# Patient Record
Sex: FEMALE | Race: WHITE | URBAN - METROPOLITAN AREA
[De-identification: names, ages, dates, MRNs, and addresses within clinical notes are randomized per-mention and may not be internally consistent; named-entity substitution may affect disease eponyms.]

---

## 2024-10-09 ENCOUNTER — TELEPHONE (OUTPATIENT)
Age: 44
End: 2024-10-09

## 2024-10-09 NOTE — TELEPHONE ENCOUNTER
This  pt  said you said you would take her as a NP please advise she is a friend of someone you know

## 2024-10-10 NOTE — TELEPHONE ENCOUNTER
Yes, I did agree  If she needs a sooner apt then when I'm scheduling- find out dates/times that work for her and I can put her in sooner.  Maybe WED afternoon

## 2024-10-16 ENCOUNTER — OFFICE VISIT (OUTPATIENT)
Age: 44
End: 2024-10-16
Payer: COMMERCIAL

## 2024-10-16 VITALS
BODY MASS INDEX: 21.44 KG/M2 | HEIGHT: 67 IN | WEIGHT: 136.6 LBS | HEART RATE: 91 BPM | TEMPERATURE: 97.9 F | SYSTOLIC BLOOD PRESSURE: 112 MMHG | OXYGEN SATURATION: 99 % | DIASTOLIC BLOOD PRESSURE: 76 MMHG

## 2024-10-16 DIAGNOSIS — Z12.31 ENCOUNTER FOR SCREENING MAMMOGRAM FOR MALIGNANT NEOPLASM OF BREAST: ICD-10-CM

## 2024-10-16 DIAGNOSIS — N92.4 EXCESSIVE BLEEDING IN PREMENOPAUSAL PERIOD: ICD-10-CM

## 2024-10-16 DIAGNOSIS — G43.809 OTHER MIGRAINE WITHOUT STATUS MIGRAINOSUS, NOT INTRACTABLE: ICD-10-CM

## 2024-10-16 DIAGNOSIS — R53.83 OTHER FATIGUE: ICD-10-CM

## 2024-10-16 DIAGNOSIS — Z00.00 WELL ADULT EXAM: Primary | ICD-10-CM

## 2024-10-16 PROCEDURE — 99386 PREV VISIT NEW AGE 40-64: CPT | Performed by: FAMILY MEDICINE

## 2024-10-16 RX ORDER — RIZATRIPTAN BENZOATE 10 MG/1
10 TABLET, ORALLY DISINTEGRATING ORAL
Qty: 9 TABLET | Refills: 1 | Status: SHIPPED | OUTPATIENT
Start: 2024-10-16 | End: 2024-10-16

## 2024-10-16 RX ORDER — PERFLUOROHEXYLOCTANE 1 MG/MG
SOLUTION OPHTHALMIC 2 TIMES DAILY
COMMUNITY

## 2024-10-16 RX ORDER — PROPRANOLOL HCL 60 MG
60 CAPSULE, EXTENDED RELEASE 24HR ORAL DAILY
Qty: 30 CAPSULE | Refills: 3 | Status: SHIPPED | OUTPATIENT
Start: 2024-10-16

## 2024-10-16 RX ORDER — LIFITEGRAST 50 MG/ML
5 SOLUTION/ DROPS OPHTHALMIC 2 TIMES DAILY
COMMUNITY

## 2024-10-16 SDOH — ECONOMIC STABILITY: FOOD INSECURITY: WITHIN THE PAST 12 MONTHS, YOU WORRIED THAT YOUR FOOD WOULD RUN OUT BEFORE YOU GOT MONEY TO BUY MORE.: OFTEN TRUE

## 2024-10-16 SDOH — ECONOMIC STABILITY: FOOD INSECURITY: WITHIN THE PAST 12 MONTHS, THE FOOD YOU BOUGHT JUST DIDN'T LAST AND YOU DIDN'T HAVE MONEY TO GET MORE.: SOMETIMES TRUE

## 2024-10-16 SDOH — ECONOMIC STABILITY: INCOME INSECURITY: HOW HARD IS IT FOR YOU TO PAY FOR THE VERY BASICS LIKE FOOD, HOUSING, MEDICAL CARE, AND HEATING?: VERY HARD

## 2024-10-16 ASSESSMENT — PATIENT HEALTH QUESTIONNAIRE - PHQ9
SUM OF ALL RESPONSES TO PHQ QUESTIONS 1-9: 2
2. FEELING DOWN, DEPRESSED OR HOPELESS: SEVERAL DAYS
1. LITTLE INTEREST OR PLEASURE IN DOING THINGS: SEVERAL DAYS
SUM OF ALL RESPONSES TO PHQ QUESTIONS 1-9: 2
SUM OF ALL RESPONSES TO PHQ9 QUESTIONS 1 & 2: 2
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
## 2024-10-17 ENCOUNTER — HOSPITAL ENCOUNTER (OUTPATIENT)
Age: 44
Setting detail: SPECIMEN
Discharge: HOME | End: 2024-10-17

## 2024-10-17 DIAGNOSIS — N92.4 EXCESSIVE BLEEDING IN PREMENOPAUSAL PERIOD: ICD-10-CM

## 2024-10-17 DIAGNOSIS — R53.83 OTHER FATIGUE: ICD-10-CM

## 2024-10-17 DIAGNOSIS — Z00.00 WELL ADULT EXAM: ICD-10-CM

## 2024-10-17 LAB
25(OH)D3 SERPL-MCNC: 29.5 NG/ML (ref 30–100)
ALBUMIN SERPL-MCNC: 5.1 G/DL (ref 3.5–5.2)
ALBUMIN/GLOB SERPL: 2 {RATIO} (ref 1–2.5)
ALP SERPL-CCNC: 53 U/L (ref 35–104)
ALT SERPL-CCNC: 10 U/L (ref 10–35)
ANION GAP SERPL CALCULATED.3IONS-SCNC: 9 MMOL/L (ref 9–16)
AST SERPL-CCNC: 21 U/L (ref 10–35)
BASOPHILS # BLD: 0.06 K/UL (ref 0–0.2)
BASOPHILS NFR BLD: 1 % (ref 0–2)
BILIRUB SERPL-MCNC: 1 MG/DL (ref 0–1.2)
BUN SERPL-MCNC: 9 MG/DL (ref 6–20)
CALCIUM SERPL-MCNC: 9.7 MG/DL (ref 8.6–10.4)
CHLORIDE SERPL-SCNC: 103 MMOL/L (ref 98–107)
CHOLEST SERPL-MCNC: 184 MG/DL (ref 0–199)
CHOLESTEROL/HDL RATIO: 2
CO2 SERPL-SCNC: 27 MMOL/L (ref 20–31)
CORTIS SERPL-MCNC: 9.2 UG/DL (ref 2.5–19.5)
CORTISOL COLLECTION INFO: NORMAL
CREAT SERPL-MCNC: 0.8 MG/DL (ref 0.5–0.9)
EOSINOPHIL # BLD: 0.04 K/UL (ref 0–0.44)
EOSINOPHILS RELATIVE PERCENT: 1 % (ref 1–4)
ERYTHROCYTE [DISTWIDTH] IN BLOOD BY AUTOMATED COUNT: 13.2 % (ref 11.8–14.4)
ESTRADIOL LEVEL: 136 PG/ML
FSH SERPL-ACNC: 3.6 MIU/ML
GFR, ESTIMATED: >90 ML/MIN/1.73M2
GLUCOSE SERPL-MCNC: 103 MG/DL (ref 74–99)
HCT VFR BLD AUTO: 42.3 % (ref 36.3–47.1)
HDLC SERPL-MCNC: 96 MG/DL
HGB BLD-MCNC: 13.5 G/DL (ref 11.9–15.1)
IMM GRANULOCYTES # BLD AUTO: <0.03 K/UL (ref 0–0.3)
IMM GRANULOCYTES NFR BLD: 0 %
INR PPP: 1
LDLC SERPL CALC-MCNC: 78 MG/DL (ref 0–100)
LH SERPL-ACNC: 2.8 MIU/ML (ref 1.7–8.6)
LYMPHOCYTES NFR BLD: 1.45 K/UL (ref 1.1–3.7)
LYMPHOCYTES RELATIVE PERCENT: 26 % (ref 24–43)
MCH RBC QN AUTO: 27.2 PG (ref 25.2–33.5)
MCHC RBC AUTO-ENTMCNC: 31.9 G/DL (ref 28.4–34.8)
MCV RBC AUTO: 85.1 FL (ref 82.6–102.9)
MONOCYTES NFR BLD: 0.46 K/UL (ref 0.1–1.2)
MONOCYTES NFR BLD: 8 % (ref 3–12)
NEUTROPHILS NFR BLD: 64 % (ref 36–65)
NEUTS SEG NFR BLD: 3.66 K/UL (ref 1.5–8.1)
NRBC BLD-RTO: 0 PER 100 WBC
PARTIAL THROMBOPLASTIN TIME: 31.9 SEC (ref 23–36.5)
PLATELET # BLD AUTO: 235 K/UL (ref 138–453)
PMV BLD AUTO: 11.2 FL (ref 8.1–13.5)
POTASSIUM SERPL-SCNC: 4.5 MMOL/L (ref 3.7–5.3)
PROT SERPL-MCNC: 7.8 G/DL (ref 6.6–8.7)
PROTHROMBIN TIME: 13 SEC (ref 11.7–14.9)
RBC # BLD AUTO: 4.97 M/UL (ref 3.95–5.11)
SODIUM SERPL-SCNC: 139 MMOL/L (ref 136–145)
T4 FREE SERPL-MCNC: 1.2 NG/DL (ref 0.92–1.68)
TRIGL SERPL-MCNC: 52 MG/DL
TSH SERPL DL<=0.05 MIU/L-ACNC: 1.2 UIU/ML (ref 0.27–4.2)
VIT B12 SERPL-MCNC: 452 PG/ML (ref 232–1245)
VLDLC SERPL CALC-MCNC: 10 MG/DL
WBC OTHER # BLD: 5.7 K/UL (ref 3.5–11.3)

## 2024-10-18 NOTE — PATIENT INSTRUCTIONS
[facility] Financial Resources*  (Call [employer]/211 if need more resources).       Area Office on Aging of [facility] ([place] and surrounding [facility]):  What they offer: Assisted Living Waiver Program, Medicare benefits counseling, and referral to community resources.  Phone Number: [phone]   [place] Community Action Partnership ([place] and counties to the east):  What they offer: Assistance with utility expenses.  Phone Number: [phone]   [facility] Veterans Service Commission:  What they offer:  Assistance with rent or mortgage payments, utilities, auto payments or repair, food, medical prescriptions, transportation to VA medical facilities for veterans and their widows who qualify.  Phone Number: [phone]   [facility] Community Action Commission ([place] and counties to the west):   What they offer: Assistance with utility expenses.  Phone Number: 480.446.1037  [place] Department of Job and Family Services (ODJSF):  What they offer: Medicaid, SNAP (food stamps), TANF (cash assistance), and childcare assistance.  Phone Number: 400.550.7791  [employer] ([facility]):  What they offer: Assistance with utility expenses.  Phone Number: 302.204.4546 ext: x11   Community Action Commission of [place]:  What they offer: Electric, gas and water payment service.  Phone: 923.679.5129    [facility] Agency on Aging, District 5:    [place], [place], [place], [place], [place], [place], [place], [place], [facility]:  What they offer: Referral to transportation and other resources for seniors.  Phone Number: [phone]   [place]    First Call for Help     [facility]  What they offer: Information and referral services about food, housing, utility assistance, drug and alcohol treatment, child and family support  Phone number: 283.876.2650    [employer]  What they offer: Programs for children, addiction recovery, family restoration  Phone number:

## 2024-10-20 ENCOUNTER — PATIENT MESSAGE (OUTPATIENT)
Age: 44
End: 2024-10-20

## 2024-10-21 RX ORDER — PREDNISONE 20 MG/1
20 TABLET ORAL DAILY
Qty: 5 TABLET | Refills: 0 | Status: SHIPPED | OUTPATIENT
Start: 2024-10-21 | End: 2024-10-26

## 2024-10-21 RX ORDER — RIZATRIPTAN BENZOATE 10 MG/1
10 TABLET, ORALLY DISINTEGRATING ORAL
Qty: 9 TABLET | Refills: 1 | Status: SHIPPED | OUTPATIENT
Start: 2024-10-21 | End: 2024-10-21

## 2024-10-21 NOTE — TELEPHONE ENCOUNTER
Discussed all with Niurka.  Will send prednisone 20mg daily x 5 d, stop excedrin  Maxalt should be taken just prn   Continue propranolol at bedtime, increase fluid intake  Reviewed labs- start vit D 1000 IU and magnesium supplement

## 2024-10-31 ENCOUNTER — TELEPHONE (OUTPATIENT)
Age: 44
End: 2024-10-31

## 2024-10-31 DIAGNOSIS — G43.809 OTHER MIGRAINE WITHOUT STATUS MIGRAINOSUS, NOT INTRACTABLE: Primary | ICD-10-CM

## 2024-10-31 RX ORDER — RIMEGEPANT SULFATE 75 MG/75MG
75 TABLET, ORALLY DISINTEGRATING ORAL EVERY OTHER DAY
Qty: 15 TABLET | Refills: 1 | Status: SHIPPED | OUTPATIENT
Start: 2024-10-31

## 2024-10-31 NOTE — TELEPHONE ENCOUNTER
Patient called to report that she is still experiencing a lot of pain.  She is finding it difficult to get through her work day and at home, as well.  She is just not able to function per her usual.    She does believe that the weather this week may be contributing to her problems with the barometric pressure changes.  She thought the Prednisone would help but instead, she feels worse.    She needs some help to get through this and is wondering what else she can do ?  She said that she has more detailed information if you want to call her ?

## 2024-10-31 NOTE — TELEPHONE ENCOUNTER
I discussed with Niurka- on propranolol, taking magnesium at bedtime.  Maxalt does help but often needs two. Trying to stay off excedrin migraine  Will add nurtec- ask pharmacist for coupon card and will do prior auth if needed- see above meds  Get CT brain and will refer to neurology-done    Please call her with 's information

## 2024-11-19 ENCOUNTER — TELEPHONE (OUTPATIENT)
Age: 44
End: 2024-11-19

## 2024-11-19 DIAGNOSIS — R51.9 SEVERE HEADACHE: Primary | ICD-10-CM

## 2024-11-19 NOTE — TELEPHONE ENCOUNTER
Patient is calling to see if her appointment can be moved up to end of day  tomorrow. She is willing to do a phone call today or tomorrow  as well she states that her pain is very bad.

## 2024-11-20 ENCOUNTER — HOSPITAL ENCOUNTER (OUTPATIENT)
Dept: CT IMAGING | Age: 44
Discharge: HOME | End: 2024-11-22
Attending: FAMILY MEDICINE
Payer: COMMERCIAL

## 2024-11-20 ENCOUNTER — PATIENT MESSAGE (OUTPATIENT)
Age: 44
End: 2024-11-20

## 2024-11-20 DIAGNOSIS — R51.9 SEVERE HEADACHE: ICD-10-CM

## 2024-11-20 DIAGNOSIS — G43.809 OTHER MIGRAINE WITHOUT STATUS MIGRAINOSUS, NOT INTRACTABLE: ICD-10-CM

## 2024-11-20 PROCEDURE — 70450 CT HEAD/BRAIN W/O DYE: CPT

## 2024-11-20 RX ORDER — RIZATRIPTAN BENZOATE 10 MG/1
10 TABLET, ORALLY DISINTEGRATING ORAL
Qty: 9 TABLET | Refills: 1 | Status: SHIPPED | OUTPATIENT
Start: 2024-11-20 | End: 2024-11-21

## 2024-11-20 NOTE — TELEPHONE ENCOUNTER
Niurka Hsieh is calling to request a refill on the following medication(s):    Medication Request:  Requested Prescriptions     Pending Prescriptions Disp Refills    rizatriptan (MAXALT-MLT) 10 MG disintegrating tablet 9 tablet 1     Sig: Take 1 tablet by mouth once as needed for Migraine May repeat in 2 hours if needed       Last Visit Date (If Applicable):  10/16/2024    Next Visit Date:    11/21/2024

## 2024-11-20 NOTE — TELEPHONE ENCOUNTER
DW patient  She is still having significant headaches especially with weather change and periods  She is on propranolol qd and nurtec QOD    STAT CT brain ordered  Please schedule CT for WED/THURSDAY  Call Dr. Chacha Ralph and see if he can see her urgently this week or next week

## 2024-11-21 ENCOUNTER — OFFICE VISIT (OUTPATIENT)
Age: 44
End: 2024-11-21

## 2024-11-21 VITALS
SYSTOLIC BLOOD PRESSURE: 106 MMHG | HEART RATE: 53 BPM | DIASTOLIC BLOOD PRESSURE: 72 MMHG | WEIGHT: 135 LBS | OXYGEN SATURATION: 98 % | TEMPERATURE: 97.3 F | HEIGHT: 67 IN | BODY MASS INDEX: 21.19 KG/M2

## 2024-11-21 DIAGNOSIS — G43.809 OTHER MIGRAINE WITHOUT STATUS MIGRAINOSUS, NOT INTRACTABLE: ICD-10-CM

## 2024-11-21 DIAGNOSIS — R51.9 SEVERE HEADACHE: Primary | ICD-10-CM

## 2024-11-21 RX ORDER — MULTIVITAMIN WITH IRON
1 TABLET ORAL DAILY
COMMUNITY

## 2024-11-21 RX ORDER — AMITRIPTYLINE HYDROCHLORIDE 10 MG/1
10 TABLET ORAL NIGHTLY
Qty: 30 TABLET | Refills: 2 | Status: SHIPPED | OUTPATIENT
Start: 2024-11-21

## 2024-11-21 NOTE — PROGRESS NOTES
(L)     Vitamin B-12 10/17/2024 452     T4 Free 10/17/2024 1.2     TSH 10/17/2024 1.20     Cholesterol, Total 10/17/2024 184     HDL 10/17/2024 96     LDL Cholesterol 10/17/2024 78     Chol/HDL Ratio 10/17/2024 2.0     Triglycerides 10/17/2024 52     VLDL 10/17/2024 10     Sodium 10/17/2024 139     Potassium 10/17/2024 4.5     Chloride 10/17/2024 103     CO2 10/17/2024 27     Anion Gap 10/17/2024 9     Glucose 10/17/2024 103 (H)     BUN 10/17/2024 9     Creatinine 10/17/2024 0.8     Est, Glom Filt Rate 10/17/2024 >90     Calcium 10/17/2024 9.7     Total Protein 10/17/2024 7.8     Albumin 10/17/2024 5.1     Albumin/Globulin Ratio 10/17/2024 2.0     Total Bilirubin 10/17/2024 1.0     Alkaline Phosphatase 10/17/2024 53     ALT 10/17/2024 10     AST 10/17/2024 21     WBC 10/17/2024 5.7     RBC 10/17/2024 4.97     Hemoglobin 10/17/2024 13.5     Hematocrit 10/17/2024 42.3     MCV 10/17/2024 85.1     MCH 10/17/2024 27.2     MCHC 10/17/2024 31.9     RDW 10/17/2024 13.2     Platelets 10/17/2024 235     MPV 10/17/2024 11.2     NRBC Automated 10/17/2024 0.0     Neutrophils % 10/17/2024 64     Lymphocytes % 10/17/2024 26     Monocytes % 10/17/2024 8     Eosinophils % 10/17/2024 1     Basophils % 10/17/2024 1     Immature Granulocytes % 10/17/2024 0     Neutrophils Absolute 10/17/2024 3.66     Lymphocytes Absolute 10/17/2024 1.45     Monocytes Absolute 10/17/2024 0.46     Eosinophils Absolute 10/17/2024 0.04     Basophils Absolute 10/17/2024 0.06     Immature Granulocytes Ab* 10/17/2024 <0.03         ASSESSMENT/PLAN     1. Severe headache  -     amitriptyline (ELAVIL) 10 MG tablet; Take 1 tablet by mouth nightly, Disp-30 tablet, R-2Normal  2. Other migraine without status migrainosus, not intractable       Continue propranolol, nurtec and prn maxalt  Add elavil at \[facility]\""  Neurology referral  Pelvic US and GYN referral  Discussed with patient per phone 11/19 and stat CT done 11/20  Return in about 2 months (around

## 2024-11-24 ENCOUNTER — PATIENT MESSAGE (OUTPATIENT)
Age: 44
End: 2024-11-24

## 2024-12-05 ENCOUNTER — APPOINTMENT (OUTPATIENT)
Dept: CT IMAGING | Age: 44
End: 2024-12-05
Attending: FAMILY MEDICINE
Payer: COMMERCIAL

## 2024-12-05 ENCOUNTER — HOSPITAL ENCOUNTER (OUTPATIENT)
Dept: ULTRASOUND IMAGING | Age: 44
Discharge: HOME | End: 2024-12-07
Attending: FAMILY MEDICINE
Payer: COMMERCIAL

## 2024-12-05 DIAGNOSIS — N92.4 EXCESSIVE BLEEDING IN PREMENOPAUSAL PERIOD: ICD-10-CM

## 2024-12-05 PROCEDURE — 76856 US EXAM PELVIC COMPLETE: CPT

## 2024-12-11 ENCOUNTER — PATIENT MESSAGE (OUTPATIENT)
Age: 44
End: 2024-12-11

## 2024-12-11 NOTE — TELEPHONE ENCOUNTER
Patient returned call and states that she no longer needs a referral, she is scheduled and they have all of her records that were needed, no referral is needed.

## 2024-12-25 DIAGNOSIS — G43.809 OTHER MIGRAINE WITHOUT STATUS MIGRAINOSUS, NOT INTRACTABLE: ICD-10-CM

## 2024-12-26 DIAGNOSIS — G43.809 OTHER MIGRAINE WITHOUT STATUS MIGRAINOSUS, NOT INTRACTABLE: ICD-10-CM

## 2024-12-26 RX ORDER — RIZATRIPTAN BENZOATE 10 MG/1
10 TABLET, ORALLY DISINTEGRATING ORAL
Qty: 9 TABLET | Refills: 1 | Status: SHIPPED | OUTPATIENT
Start: 2024-12-26 | End: 2024-12-26

## 2024-12-27 RX ORDER — RIMEGEPANT SULFATE 75 MG/75MG
TABLET, ORALLY DISINTEGRATING ORAL
Qty: 15 TABLET | Refills: 0 | Status: SHIPPED | OUTPATIENT
Start: 2024-12-27

## 2024-12-27 NOTE — TELEPHONE ENCOUNTER
Niurka Hsieh is calling to request a refill on the following medication(s):    Medication Request:  Requested Prescriptions     Pending Prescriptions Disp Refills    NURTEC 75 MG TBDP [Pharmacy Med Name: Nurtec Oral Tablet Disintegrating 75 MG] 15 tablet 0     Sig: DISSOLVE 1 TABLET IN MOUTH EVERY OTHER DAY       Last Visit Date (If Applicable):  11/21/2024    Next Visit Date:    1/27/2025

## 2025-01-10 ENCOUNTER — HOSPITAL ENCOUNTER (OUTPATIENT)
Dept: MAMMOGRAPHY | Age: 45
Discharge: HOME | End: 2025-01-12
Attending: FAMILY MEDICINE
Payer: COMMERCIAL

## 2025-01-10 VITALS — WEIGHT: 135 LBS | BODY MASS INDEX: 21.19 KG/M2 | HEIGHT: 67 IN

## 2025-01-10 DIAGNOSIS — Z12.31 ENCOUNTER FOR SCREENING MAMMOGRAM FOR MALIGNANT NEOPLASM OF BREAST: ICD-10-CM

## 2025-01-10 PROCEDURE — 77063 BREAST TOMOSYNTHESIS BI: CPT

## 2025-01-13 DIAGNOSIS — R92.8 ABNORMAL MAMMOGRAM: Primary | ICD-10-CM

## 2025-01-23 PROBLEM — G44.86 CERVICOGENIC HEADACHE: Status: ACTIVE | Noted: 2024-12-16

## 2025-01-23 PROBLEM — G44.209 TENSION TYPE HEADACHE: Status: ACTIVE | Noted: 2024-12-16

## 2025-01-23 PROBLEM — R51.9 CHRONIC DAILY HEADACHE: Status: ACTIVE | Noted: 2024-12-16

## 2025-01-23 PROBLEM — G43.009 MIGRAINE WITHOUT AURA AND WITHOUT STATUS MIGRAINOSUS, NOT INTRACTABLE: Status: ACTIVE | Noted: 2024-12-16

## 2025-01-23 PROBLEM — M54.2 CERVICALGIA: Status: ACTIVE | Noted: 2024-12-16

## 2025-01-23 RX ORDER — PROPYLENE GLYCOL 0.6 G/100ML
LIQUID OPHTHALMIC
COMMUNITY
End: 2025-01-27

## 2025-01-23 NOTE — PATIENT INSTRUCTIONS
Niurka    Thank you for choosing [facility].  We know you have options when it comes to your healthcare; we appreciate that you chose us. Our goal is to provide exceptional  service and world class care to every patient.  You will be receiving a survey via email or text message asking for your feedback.  Please take a few minutes to share your thoughts about your recent visit. Your comments help us understand what we do well and ways we can improve.  Thank you in advance for your valuable feedback.      Dr. Chilo GIRON MA

## 2025-01-27 ENCOUNTER — OFFICE VISIT (OUTPATIENT)
Age: 45
End: 2025-01-27
Payer: COMMERCIAL

## 2025-01-27 VITALS
HEART RATE: 70 BPM | DIASTOLIC BLOOD PRESSURE: 74 MMHG | SYSTOLIC BLOOD PRESSURE: 110 MMHG | OXYGEN SATURATION: 97 % | HEIGHT: 67 IN | WEIGHT: 139 LBS | BODY MASS INDEX: 21.82 KG/M2

## 2025-01-27 DIAGNOSIS — G43.809 OTHER MIGRAINE WITHOUT STATUS MIGRAINOSUS, NOT INTRACTABLE: ICD-10-CM

## 2025-01-27 DIAGNOSIS — R51.9 CHRONIC DAILY HEADACHE: ICD-10-CM

## 2025-01-27 DIAGNOSIS — G43.809 OTHER MIGRAINE WITHOUT STATUS MIGRAINOSUS, NOT INTRACTABLE: Primary | ICD-10-CM

## 2025-01-27 DIAGNOSIS — D25.9 UTERINE LEIOMYOMA, UNSPECIFIED LOCATION: ICD-10-CM

## 2025-01-27 DIAGNOSIS — R51.9 SEVERE HEADACHE: ICD-10-CM

## 2025-01-27 PROCEDURE — 99214 OFFICE O/P EST MOD 30 MIN: CPT | Performed by: FAMILY MEDICINE

## 2025-01-27 RX ORDER — RIMEGEPANT SULFATE 75 MG/75MG
75 TABLET, ORALLY DISINTEGRATING ORAL EVERY OTHER DAY
Qty: 15 TABLET | Refills: 3 | Status: SHIPPED | OUTPATIENT
Start: 2025-01-27

## 2025-01-27 RX ORDER — PROPRANOLOL HYDROCHLORIDE 60 MG/1
60 CAPSULE, EXTENDED RELEASE ORAL DAILY
Qty: 30 CAPSULE | Refills: 3 | Status: SHIPPED | OUTPATIENT
Start: 2025-01-27

## 2025-01-27 RX ORDER — RIZATRIPTAN BENZOATE 10 MG/1
10 TABLET, ORALLY DISINTEGRATING ORAL
Qty: 9 TABLET | Refills: 3 | Status: SHIPPED | OUTPATIENT
Start: 2025-01-27 | End: 2025-01-27

## 2025-01-27 RX ORDER — AMITRIPTYLINE HYDROCHLORIDE 10 MG/1
10 TABLET ORAL NIGHTLY
Qty: 30 TABLET | Refills: 3 | Status: SHIPPED | OUTPATIENT
Start: 2025-01-27

## 2025-01-27 SDOH — ECONOMIC STABILITY: FOOD INSECURITY: WITHIN THE PAST 12 MONTHS, THE FOOD YOU BOUGHT JUST DIDN'T LAST AND YOU DIDN'T HAVE MONEY TO GET MORE.: NEVER TRUE

## 2025-01-27 SDOH — ECONOMIC STABILITY: FOOD INSECURITY: WITHIN THE PAST 12 MONTHS, YOU WORRIED THAT YOUR FOOD WOULD RUN OUT BEFORE YOU GOT MONEY TO BUY MORE.: NEVER TRUE

## 2025-01-27 ASSESSMENT — PATIENT HEALTH QUESTIONNAIRE - PHQ9
SUM OF ALL RESPONSES TO PHQ9 QUESTIONS 1 & 2: 2
SUM OF ALL RESPONSES TO PHQ QUESTIONS 1-9: 2
2. FEELING DOWN, DEPRESSED OR HOPELESS: SEVERAL DAYS
1. LITTLE INTEREST OR PLEASURE IN DOING THINGS: SEVERAL DAYS
SUM OF ALL RESPONSES TO PHQ QUESTIONS 1-9: 2

## 2025-01-27 NOTE — PROGRESS NOTES
[facility]  [facility] MEDICINE  [address]  [facility] [address]  Dept: [phone]     Date of Visit:  2025  Patient Name: Niurka Hsieh   Patient :  1980     CHIEF COMPLAINT/HPI:     Chief Complaint   Patient presents with    2 months        HPI      Niurka Hsieh, 44 y.o. presents today for a follow up of migraines, chronic daily headache, and uterine leiomyoma. She saw neurology at [facility]. She continues to take propranolol and elavil nightly, Nurtec every other day and maxalt as needed. She attempts to take maxalt less than twice weekly. She notes her migraines worsen with the weather and when she is on her period. She advised her to increase her magnesium supplement to 2 daily. She also began supplementing Vitamin B daily. She sees neurology again on 2025. She notes she is functioning better and able to work. She is not experiencing daily headaches anymore. She reports the pain she is experiencing now is about half than what she experienced initially. She reports neck pain. She reports nausea, light sensitivity, and sound sensitivity with her migraines. She drinks water frequently throughout the day. She drinks one small cup of coffee daily. She is scheduled to start physical therapy for her neck/shoulders and for light touch massage therapy. She has not been consuming alcohol.     She sees GYN on 2025. She is not currently on a birth control. She takes a women's multivitamin that has Vitamin D.     She denies dizziness, syncope, chest pain, shortness of breath, vomiting, diarrhea, constipation, blood in her stool, edema, claudication, skin changes, vision changes, fever, or chills.      REVIEW OF SYSTEM      Review of Systems:   Constitutional:  Negative for chills, fatigue, fever and unexpected weight change.   Eyes:  Negative for visual disturbance.   Respiratory:  Negative for cough, chest tightness, shortness of breath and

## 2025-02-07 ENCOUNTER — HOSPITAL ENCOUNTER (OUTPATIENT)
Dept: WOMENS IMAGING | Age: 45
Discharge: HOME | End: 2025-02-09
Payer: COMMERCIAL

## 2025-02-07 DIAGNOSIS — R92.8 ABNORMAL MAMMOGRAM: ICD-10-CM

## 2025-02-07 PROCEDURE — G0279 TOMOSYNTHESIS, MAMMO: HCPCS

## 2025-02-07 PROCEDURE — 76642 ULTRASOUND BREAST LIMITED: CPT

## 2025-02-14 ENCOUNTER — OFFICE VISIT (OUTPATIENT)
Dept: OBGYN CLINIC | Age: 45
End: 2025-02-14
Payer: COMMERCIAL

## 2025-02-14 VITALS
HEART RATE: 91 BPM | SYSTOLIC BLOOD PRESSURE: 127 MMHG | HEIGHT: 67 IN | BODY MASS INDEX: 22.03 KG/M2 | DIASTOLIC BLOOD PRESSURE: 68 MMHG | WEIGHT: 140.4 LBS

## 2025-02-14 DIAGNOSIS — N84.0 UTERINE POLYP: ICD-10-CM

## 2025-02-14 DIAGNOSIS — D21.9 FIBROIDS: ICD-10-CM

## 2025-02-14 DIAGNOSIS — G43.009 MIGRAINE WITHOUT AURA AND WITHOUT STATUS MIGRAINOSUS, NOT INTRACTABLE: ICD-10-CM

## 2025-02-14 DIAGNOSIS — N93.9 ABNORMAL UTERINE BLEEDING: Primary | ICD-10-CM

## 2025-02-14 PROCEDURE — 99203 OFFICE O/P NEW LOW 30 MIN: CPT | Performed by: STUDENT IN AN ORGANIZED HEALTH CARE EDUCATION/TRAINING PROGRAM

## 2025-02-14 RX ORDER — NORETHINDRONE ACETATE AND ETHINYL ESTRADIOL 1.5-30(21)
1 KIT ORAL DAILY
Qty: 1 PACKET | Refills: 11 | Status: SHIPPED | OUTPATIENT
Start: 2025-02-14

## 2025-02-14 RX ORDER — RIBOFLAVIN (VITAMIN B2) 100 MG
400 TABLET ORAL
COMMUNITY

## 2025-02-14 NOTE — PROGRESS NOTES
OB/GYN Problem Visit    Niurka Hsieh  2025                       Primary Care Physician: Luciana Woo DO    CC:   Chief Complaint   Patient presents with    New Patient         HPI: Niurka Hsieh is a 44 y.o. female G2, P2    The patient was seen and examined.  Patient is here to discuss some issues she has been having.  Patient has excessive bleeding in the premenopausal period.  Patient has also been struggling with migraines for a very long time.  She is now following with a new neurologist and is on a myriad of medications to help improve this.  She states her migraines do not have aura.  She went through a time period where she was having migraines almost every single day.    She did have a period of time where she was on birth control pills and she thinks maybe that helped her migraines as well as her bleeding.  After getting COVID she stopped taking the pills and thinks she probably has some long-term effects from her COVID.    She is desperate to feel better and is unsure of what to do about her bleeding.    REVIEW OF SYSTEMS:   Constitutional: negative fever, negative chills   HEENT: negative visual disturbances, positive migraines  Respiratory: negative dyspnea, negative cough  Cardiovascular: negative chest pain,  negative palpitations  Gastrointestinal: negative abdominal pain, negative RUQ pain, negative N/V, negative diarrhea, negative constipation  Genitourinary: negative dysuria, negative vaginal discharge, positive vaginal bleeding  Dermatological: negative rash  Hematologic: negative bruising  Immunologic/Lymphatic: negative recent illness, negative recent sick contact  Musculoskeletal: negative back pain, negative myalgias, negative arthralgias  Neurological:  negative dizziness, negative weakness  Behavior/Psych: negative depression, negative anxiety      OBSTETRICAL HISTORY:  OB History    Para Term  AB Living   2 2 2         SAB IAB Ectopic Molar Multiple

## 2025-02-17 ENCOUNTER — PREP FOR PROCEDURE (OUTPATIENT)
Dept: OBGYN CLINIC | Age: 45
End: 2025-02-17

## 2025-02-17 DIAGNOSIS — Z01.818 PRE-OP TESTING: Primary | ICD-10-CM

## 2025-02-17 DIAGNOSIS — N93.9 ABNORMAL UTERINE BLEEDING (AUB): ICD-10-CM

## 2025-02-19 ENCOUNTER — TELEPHONE (OUTPATIENT)
Dept: OBGYN CLINIC | Age: 45
End: 2025-02-19

## 2025-03-05 ENCOUNTER — TELEPHONE (OUTPATIENT)
Dept: OBGYN CLINIC | Age: 45
End: 2025-03-05

## 2025-04-30 NOTE — PATIENT INSTRUCTIONS
Niurka    Thank you for choosing [facility].  We know you have options when it comes to your healthcare; we appreciate that you chose us. Our goal is to provide exceptional  service and world class care to every patient.  You will be receiving a survey via email or text message asking for your feedback.  Please take a few minutes to share your thoughts about your recent visit. Your comments help us understand what we do well and ways we can improve.  Thank you in advance for your valuable feedback.      Dr. Chilo Guevara, CMA

## 2025-05-01 ENCOUNTER — OFFICE VISIT (OUTPATIENT)
Age: 45
End: 2025-05-01
Payer: COMMERCIAL

## 2025-05-01 ENCOUNTER — TELEPHONE (OUTPATIENT)
Age: 45
End: 2025-05-01

## 2025-05-01 VITALS
WEIGHT: 142.8 LBS | TEMPERATURE: 98.7 F | HEART RATE: 87 BPM | DIASTOLIC BLOOD PRESSURE: 64 MMHG | OXYGEN SATURATION: 99 % | BODY MASS INDEX: 22.37 KG/M2 | SYSTOLIC BLOOD PRESSURE: 102 MMHG

## 2025-05-01 DIAGNOSIS — G43.809 OTHER MIGRAINE WITHOUT STATUS MIGRAINOSUS, NOT INTRACTABLE: Primary | ICD-10-CM

## 2025-05-01 DIAGNOSIS — D25.9 UTERINE LEIOMYOMA, UNSPECIFIED LOCATION: ICD-10-CM

## 2025-05-01 DIAGNOSIS — R51.9 CHRONIC DAILY HEADACHE: ICD-10-CM

## 2025-05-01 DIAGNOSIS — G43.809 OTHER MIGRAINE WITHOUT STATUS MIGRAINOSUS, NOT INTRACTABLE: ICD-10-CM

## 2025-05-01 DIAGNOSIS — R51.9 SEVERE HEADACHE: ICD-10-CM

## 2025-05-01 PROCEDURE — 99214 OFFICE O/P EST MOD 30 MIN: CPT | Performed by: FAMILY MEDICINE

## 2025-05-01 RX ORDER — RIMEGEPANT SULFATE 75 MG/75MG
75 TABLET, ORALLY DISINTEGRATING ORAL EVERY OTHER DAY
Qty: 15 TABLET | Refills: 3 | Status: SHIPPED | OUTPATIENT
Start: 2025-05-01

## 2025-05-01 RX ORDER — RIZATRIPTAN BENZOATE 10 MG/1
10 TABLET, ORALLY DISINTEGRATING ORAL
Qty: 9 TABLET | Refills: 3 | Status: SHIPPED | OUTPATIENT
Start: 2025-05-01

## 2025-05-01 RX ORDER — AMITRIPTYLINE HYDROCHLORIDE 10 MG/1
10 TABLET ORAL NIGHTLY
Qty: 30 TABLET | Refills: 3 | Status: SHIPPED | OUTPATIENT
Start: 2025-05-01

## 2025-05-01 RX ORDER — PROPRANOLOL HYDROCHLORIDE 60 MG/1
60 CAPSULE, EXTENDED RELEASE ORAL DAILY
Qty: 30 CAPSULE | Refills: 3 | Status: SHIPPED | OUTPATIENT
Start: 2025-05-01

## 2025-05-01 NOTE — TELEPHONE ENCOUNTER
Referral placed to   Please call his office and see if they can work her in or cancellation list - she has been suffering with migraines
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
## 2025-05-02 NOTE — TELEPHONE ENCOUNTER
Patient was notified about the referral.  She tried to call Dr. Ralph today to set up an appointment but there office is not open on Friday's.  She is going to try on Monday.  I told her that we will try and call on Monday as well.      Dr. Chacha Ralph [phone]

## 2025-05-05 NOTE — TELEPHONE ENCOUNTER
Called office they stated they do not have the referral but when they get it they will get her in that week most likely. Faxing over now. They do not do escribe

## 2025-05-06 ENCOUNTER — TELEPHONE (OUTPATIENT)
Age: 45
End: 2025-05-06

## 2025-05-06 DIAGNOSIS — R51.9 SEVERE HEADACHE: ICD-10-CM

## 2025-05-06 RX ORDER — AMITRIPTYLINE HYDROCHLORIDE 10 MG/1
20 TABLET ORAL NIGHTLY
Qty: 60 TABLET | Refills: 3 | Status: SHIPPED | OUTPATIENT
Start: 2025-05-06

## 2025-05-06 NOTE — TELEPHONE ENCOUNTER
Upped prescription update Amitriptyline to 20 MG daily all this Friday-Monday and noticed some improvement patient said she does want to continue dosage if Dr. BOOGIE would like her to as well and if sent would like it sent to the [facility] pharmacy in [place]. Patient aware Dr. BOOGIE not in office today.

## 2025-05-06 NOTE — TELEPHONE ENCOUNTER
Patient called back and she was given message and she verbalized understanding. She called other office and they received referral their office is requesting we fax over results of patient's CT head done on 11/20/24. Faxed to #[phone].

## 2025-05-08 ENCOUNTER — ANESTHESIA EVENT (OUTPATIENT)
Dept: OPERATING ROOM | Age: 45
End: 2025-05-08
Payer: COMMERCIAL

## 2025-05-08 NOTE — H&P
OB/GYN Pre-Op H&P  [facility]    Patient Name: Niurka Hsieh     Patient : 1980  Room/Bed: [facility] OR [facility]/NONE  Admission Date/Time: 2025  5:45 AM  Primary Care Physician: Luciana Woo DO  MRN: [id number]    Date: 2025  Time: 6:50 AM    Patient is a 44-year-old female who was previously seen by her primary OB/GYN for heavy menstrual bleeding.  Patient was previously on oral contraceptive pills which have improved her bleeding symptoms in the past.  Pelvic ultrasound performed in December showed a likely uterine polyp as well as uterine fibroids.  Patient consented all risk benefits and alternatives she is elected for hysteroscopy with dilation and curettage today with removal of endometrial polyp which is likely causing increased bleeding.     The patient was seen in pre-op holding. She is here for hysteroscopy, dilation and curettage, polypectomy, Pap smear.    The procedure risks and complications were reviewed.  The labs, Consent, and H&P were reviewed and updated.  The patient was counseled on the possibility of  the need of a second surgery.  The patient voiced understanding and had all of her questions answered. The possibility of incomplete removal of abnormal tissue was discussed.    OBSTETRICAL HISTORY:   OB History    Para Term  AB Living   2 2 2 0 0 0   SAB IAB Ectopic Molar Multiple Live Births   0 0 0 0 0 0      # Outcome Date GA Lbr Jay/2nd Weight Sex Type Anes PTL Lv   2 Term      Vag-Spont      1 Term      Vag-Spont          PAST MEDICAL HISTORY:   has a past medical history of Abnormal uterine bleeding (AUB), Migraines, Under care of service provider, Under care of service provider, Uterine fibroid, and Uterine polyp.    PAST SURGICAL HISTORY:   has a past surgical history that includes Jonancy tooth extraction (Bilateral).    ALLERGIES:  Allergies as of 2025    (No Known Allergies)       MEDICATIONS:  No current facility-administered

## 2025-05-09 ENCOUNTER — HOSPITAL ENCOUNTER (OUTPATIENT)
Age: 45
Setting detail: OUTPATIENT SURGERY
Discharge: HOME | End: 2025-05-09
Attending: STUDENT IN AN ORGANIZED HEALTH CARE EDUCATION/TRAINING PROGRAM | Admitting: STUDENT IN AN ORGANIZED HEALTH CARE EDUCATION/TRAINING PROGRAM
Payer: COMMERCIAL

## 2025-05-09 ENCOUNTER — ANESTHESIA (OUTPATIENT)
Dept: OPERATING ROOM | Age: 45
End: 2025-05-09
Payer: COMMERCIAL

## 2025-05-09 ENCOUNTER — HOSPITAL ENCOUNTER (OUTPATIENT)
Age: 45
Setting detail: SPECIMEN
Discharge: HOME | End: 2025-05-09
Attending: STUDENT IN AN ORGANIZED HEALTH CARE EDUCATION/TRAINING PROGRAM
Payer: COMMERCIAL

## 2025-05-09 VITALS
TEMPERATURE: 96.8 F | RESPIRATION RATE: 19 BRPM | HEART RATE: 70 BPM | DIASTOLIC BLOOD PRESSURE: 77 MMHG | SYSTOLIC BLOOD PRESSURE: 127 MMHG | OXYGEN SATURATION: 85 %

## 2025-05-09 DIAGNOSIS — N93.9 ABNORMAL UTERINE BLEEDING (AUB): ICD-10-CM

## 2025-05-09 PROBLEM — D21.9 FIBROIDS: Status: ACTIVE | Noted: 2025-05-09

## 2025-05-09 PROBLEM — Z98.890 HISTORY OF HYSTEROSCOPY: Status: ACTIVE | Noted: 2025-05-09

## 2025-05-09 LAB
ERYTHROCYTE [DISTWIDTH] IN BLOOD BY AUTOMATED COUNT: 12.7 % (ref 11.8–14.4)
HCT VFR BLD AUTO: 38.9 % (ref 36.3–47.1)
HGB BLD-MCNC: 13.3 G/DL (ref 11.9–15.1)
MCH RBC QN AUTO: 29.4 PG (ref 25.2–33.5)
MCHC RBC AUTO-ENTMCNC: 34.2 G/DL (ref 28.4–34.8)
MCV RBC AUTO: 86.1 FL (ref 82.6–102.9)
NRBC BLD-RTO: 0 PER 100 WBC
PLATELET # BLD AUTO: 236 K/UL (ref 138–453)
PMV BLD AUTO: 10.7 FL (ref 8.1–13.5)
RBC # BLD AUTO: 4.52 M/UL (ref 3.95–5.11)
WBC OTHER # BLD: 5.8 K/UL (ref 3.5–11.3)

## 2025-05-09 PROCEDURE — 58558 HYSTEROSCOPY BIOPSY: CPT | Performed by: STUDENT IN AN ORGANIZED HEALTH CARE EDUCATION/TRAINING PROGRAM

## 2025-05-09 PROCEDURE — 7100000010 HC PHASE II RECOVERY - FIRST 15 MIN: Performed by: STUDENT IN AN ORGANIZED HEALTH CARE EDUCATION/TRAINING PROGRAM

## 2025-05-09 PROCEDURE — 2709999900 HC NON-CHARGEABLE SUPPLY: Performed by: STUDENT IN AN ORGANIZED HEALTH CARE EDUCATION/TRAINING PROGRAM

## 2025-05-09 PROCEDURE — 3600000014 HC SURGERY LEVEL 4 ADDTL 15MIN: Performed by: STUDENT IN AN ORGANIZED HEALTH CARE EDUCATION/TRAINING PROGRAM

## 2025-05-09 PROCEDURE — 6370000000 HC RX 637 (ALT 250 FOR IP)

## 2025-05-09 PROCEDURE — 81025 URINE PREGNANCY TEST: CPT

## 2025-05-09 PROCEDURE — 3700000001 HC ADD 15 MINUTES (ANESTHESIA): Performed by: STUDENT IN AN ORGANIZED HEALTH CARE EDUCATION/TRAINING PROGRAM

## 2025-05-09 PROCEDURE — 7100000001 HC PACU RECOVERY - ADDTL 15 MIN: Performed by: STUDENT IN AN ORGANIZED HEALTH CARE EDUCATION/TRAINING PROGRAM

## 2025-05-09 PROCEDURE — G0145 SCR C/V CYTO,THINLAYER,RESCR: HCPCS

## 2025-05-09 PROCEDURE — 2500000003 HC RX 250 WO HCPCS: Performed by: STUDENT IN AN ORGANIZED HEALTH CARE EDUCATION/TRAINING PROGRAM

## 2025-05-09 PROCEDURE — 2720000010 HC SURG SUPPLY STERILE: Performed by: STUDENT IN AN ORGANIZED HEALTH CARE EDUCATION/TRAINING PROGRAM

## 2025-05-09 PROCEDURE — 3700000000 HC ANESTHESIA ATTENDED CARE: Performed by: STUDENT IN AN ORGANIZED HEALTH CARE EDUCATION/TRAINING PROGRAM

## 2025-05-09 PROCEDURE — 87624 HPV HI-RISK TYP POOLED RSLT: CPT

## 2025-05-09 PROCEDURE — 7100000000 HC PACU RECOVERY - FIRST 15 MIN: Performed by: STUDENT IN AN ORGANIZED HEALTH CARE EDUCATION/TRAINING PROGRAM

## 2025-05-09 PROCEDURE — 3600000004 HC SURGERY LEVEL 4 BASE: Performed by: STUDENT IN AN ORGANIZED HEALTH CARE EDUCATION/TRAINING PROGRAM

## 2025-05-09 PROCEDURE — 88305 TISSUE EXAM BY PATHOLOGIST: CPT

## 2025-05-09 PROCEDURE — 7100000011 HC PHASE II RECOVERY - ADDTL 15 MIN: Performed by: STUDENT IN AN ORGANIZED HEALTH CARE EDUCATION/TRAINING PROGRAM

## 2025-05-09 PROCEDURE — 6360000002 HC RX W HCPCS

## 2025-05-09 PROCEDURE — 2580000003 HC RX 258

## 2025-05-09 PROCEDURE — 85027 COMPLETE CBC AUTOMATED: CPT

## 2025-05-09 RX ORDER — HYDRALAZINE HYDROCHLORIDE 20 MG/ML
10 INJECTION INTRAMUSCULAR; INTRAVENOUS
Status: DISCONTINUED | OUTPATIENT
Start: 2025-05-09 | End: 2025-05-09 | Stop reason: HOSPADM

## 2025-05-09 RX ORDER — ONDANSETRON 2 MG/ML
4 INJECTION INTRAMUSCULAR; INTRAVENOUS
Status: DISCONTINUED | OUTPATIENT
Start: 2025-05-09 | End: 2025-05-09 | Stop reason: HOSPADM

## 2025-05-09 RX ORDER — PHENYLEPHRINE HCL IN 0.9% NACL 1 MG/10 ML
SYRINGE (ML) INTRAVENOUS
Status: DISCONTINUED | OUTPATIENT
Start: 2025-05-09 | End: 2025-05-09 | Stop reason: SDUPTHER

## 2025-05-09 RX ORDER — SODIUM CHLORIDE, SODIUM LACTATE, POTASSIUM CHLORIDE, CALCIUM CHLORIDE 600; 310; 30; 20 MG/100ML; MG/100ML; MG/100ML; MG/100ML
INJECTION, SOLUTION INTRAVENOUS
Status: DISCONTINUED | OUTPATIENT
Start: 2025-05-09 | End: 2025-05-09 | Stop reason: SDUPTHER

## 2025-05-09 RX ORDER — LIDOCAINE HYDROCHLORIDE 10 MG/ML
INJECTION, SOLUTION EPIDURAL; INFILTRATION; INTRACAUDAL; PERINEURAL
Status: DISCONTINUED | OUTPATIENT
Start: 2025-05-09 | End: 2025-05-09 | Stop reason: SDUPTHER

## 2025-05-09 RX ORDER — SODIUM CHLORIDE 0.9 % (FLUSH) 0.9 %
5-40 SYRINGE (ML) INJECTION EVERY 12 HOURS SCHEDULED
Status: DISCONTINUED | OUTPATIENT
Start: 2025-05-09 | End: 2025-05-09 | Stop reason: HOSPADM

## 2025-05-09 RX ORDER — DOCUSATE SODIUM 100 MG/1
100 CAPSULE, LIQUID FILLED ORAL 2 TIMES DAILY
Qty: 60 CAPSULE | Refills: 1 | Status: SHIPPED | OUTPATIENT
Start: 2025-05-09 | End: 2025-07-08

## 2025-05-09 RX ORDER — SCOPOLAMINE 1 MG/3D
PATCH, EXTENDED RELEASE TRANSDERMAL
Status: DISCONTINUED | OUTPATIENT
Start: 2025-05-09 | End: 2025-05-09 | Stop reason: SDUPTHER

## 2025-05-09 RX ORDER — MAGNESIUM HYDROXIDE 1200 MG/15ML
LIQUID ORAL CONTINUOUS PRN
Status: DISCONTINUED | OUTPATIENT
Start: 2025-05-09 | End: 2025-05-09 | Stop reason: HOSPADM

## 2025-05-09 RX ORDER — ONDANSETRON 4 MG/1
4 TABLET, FILM COATED ORAL EVERY 8 HOURS PRN
Qty: 27 TABLET | Refills: 1 | Status: SHIPPED | OUTPATIENT
Start: 2025-05-09

## 2025-05-09 RX ORDER — IBUPROFEN 600 MG/1
600 TABLET, FILM COATED ORAL EVERY 6 HOURS PRN
Qty: 30 EACH | Refills: 1 | Status: SHIPPED | OUTPATIENT
Start: 2025-05-09

## 2025-05-09 RX ORDER — DEXAMETHASONE SODIUM PHOSPHATE 10 MG/ML
INJECTION, SOLUTION INTRA-ARTICULAR; INTRALESIONAL; INTRAMUSCULAR; INTRAVENOUS; SOFT TISSUE
Status: DISCONTINUED | OUTPATIENT
Start: 2025-05-09 | End: 2025-05-09 | Stop reason: SDUPTHER

## 2025-05-09 RX ORDER — FENTANYL CITRATE 50 UG/ML
INJECTION, SOLUTION INTRAMUSCULAR; INTRAVENOUS
Status: DISCONTINUED | OUTPATIENT
Start: 2025-05-09 | End: 2025-05-09 | Stop reason: SDUPTHER

## 2025-05-09 RX ORDER — ONDANSETRON 2 MG/ML
INJECTION INTRAMUSCULAR; INTRAVENOUS
Status: DISCONTINUED | OUTPATIENT
Start: 2025-05-09 | End: 2025-05-09 | Stop reason: SDUPTHER

## 2025-05-09 RX ORDER — MIDAZOLAM HYDROCHLORIDE 1 MG/ML
INJECTION, SOLUTION INTRAMUSCULAR; INTRAVENOUS
Status: DISCONTINUED | OUTPATIENT
Start: 2025-05-09 | End: 2025-05-09 | Stop reason: SDUPTHER

## 2025-05-09 RX ORDER — SODIUM CHLORIDE 0.9 % (FLUSH) 0.9 %
5-40 SYRINGE (ML) INJECTION PRN
Status: DISCONTINUED | OUTPATIENT
Start: 2025-05-09 | End: 2025-05-09 | Stop reason: HOSPADM

## 2025-05-09 RX ORDER — SODIUM CHLORIDE 9 MG/ML
INJECTION, SOLUTION INTRAVENOUS PRN
Status: DISCONTINUED | OUTPATIENT
Start: 2025-05-09 | End: 2025-05-09 | Stop reason: HOSPADM

## 2025-05-09 RX ORDER — PROPOFOL 10 MG/ML
INJECTION, EMULSION INTRAVENOUS
Status: DISCONTINUED | OUTPATIENT
Start: 2025-05-09 | End: 2025-05-09 | Stop reason: SDUPTHER

## 2025-05-09 RX ORDER — ACETAMINOPHEN 500 MG
1000 TABLET ORAL 3 TIMES DAILY
Qty: 30 TABLET | Refills: 0 | Status: SHIPPED | OUTPATIENT
Start: 2025-05-09

## 2025-05-09 RX ORDER — LABETALOL HYDROCHLORIDE 5 MG/ML
10 INJECTION, SOLUTION INTRAVENOUS
Status: DISCONTINUED | OUTPATIENT
Start: 2025-05-09 | End: 2025-05-09 | Stop reason: HOSPADM

## 2025-05-09 RX ORDER — NALOXONE HYDROCHLORIDE 0.4 MG/ML
INJECTION, SOLUTION INTRAMUSCULAR; INTRAVENOUS; SUBCUTANEOUS PRN
Status: DISCONTINUED | OUTPATIENT
Start: 2025-05-09 | End: 2025-05-09 | Stop reason: HOSPADM

## 2025-05-09 RX ADMIN — Medication 100 MCG: at 07:52

## 2025-05-09 RX ADMIN — Medication 100 MCG: at 07:27

## 2025-05-09 RX ADMIN — SCOPALAMINE 1 PATCH: 1 PATCH, EXTENDED RELEASE TRANSDERMAL at 06:55

## 2025-05-09 RX ADMIN — LIDOCAINE HYDROCHLORIDE 50 MG: 10 INJECTION, SOLUTION EPIDURAL; INFILTRATION; INTRACAUDAL; PERINEURAL at 07:17

## 2025-05-09 RX ADMIN — Medication 100 MCG: at 07:46

## 2025-05-09 RX ADMIN — FENTANYL CITRATE 50 MCG: 50 INJECTION, SOLUTION INTRAMUSCULAR; INTRAVENOUS at 07:17

## 2025-05-09 RX ADMIN — Medication 100 MCG: at 07:25

## 2025-05-09 RX ADMIN — ONDANSETRON 4 MG: 2 INJECTION INTRAMUSCULAR; INTRAVENOUS at 07:55

## 2025-05-09 RX ADMIN — DEXAMETHASONE SODIUM PHOSPHATE 10 MG: 10 INJECTION INTRAMUSCULAR; INTRAVENOUS at 07:23

## 2025-05-09 RX ADMIN — PROPOFOL 200 MG: 10 INJECTION, EMULSION INTRAVENOUS at 07:17

## 2025-05-09 RX ADMIN — SODIUM CHLORIDE, POTASSIUM CHLORIDE, SODIUM LACTATE AND CALCIUM CHLORIDE: 600; 310; 30; 20 INJECTION, SOLUTION INTRAVENOUS at 07:11

## 2025-05-09 RX ADMIN — MIDAZOLAM 2 MG: 1 INJECTION INTRAMUSCULAR; INTRAVENOUS at 07:11

## 2025-05-09 RX ADMIN — Medication 100 MCG: at 07:32

## 2025-05-09 ASSESSMENT — PAIN SCALES - GENERAL: PAINLEVEL_OUTOF10: 0

## 2025-05-09 NOTE — OP NOTE
Operative Note  Department of Obstetrics and Gynecology  [facility]       Patient: Niurka Hsieh   : 1980  MRN: [id number]       Acct: [id number]   PCP: Luciana Woo DO  Date of Procedure: 25    Pre-operative Diagnosis: 44 y.o. female     Uterine Polyp   Heavy vaginal bleeding   Uterine fibroids      Post-operative Diagnosis:  Heavy vaginal bleeding   Uterine fibroids   Cervical polyp   Partial uterine septum      Procedure: Hysterectomy, dilation and curettage, Pap smear       Surgeon: Dr. Bueno      Assistant(s): Shamika Ross DO, PGY4; Chelly Bennett, MS4       Anesthesia: general via ET tube     Indications: Patient is 44 y.o. female with a history of abnormal uterine bleeding which was noted to be heavy. Patient had an US  which showed a likely uterine polyp vs uterine fibroid. Due to heavy bleeding patient was consented for all r/b/a and desires hysteroscopy with possible polypectomy.     Procedure Details:   The patient was seen in the pre-op room. The risks, benefits, complications, treatment options, and expected outcomes were discussed with the patient. The patient concurred with the proposed plan, giving informed consent. The patient was taken to the Operating Room and identified as Niurka Hsieh and the procedure was verified. A Time Out was held and the above information confirmed.     After administration of general anesthesia, the patient was placed in the dorsolithotomy position with yellofin stirrups and examination under general anesthesia performed with findings as noted below. The patient was prepped and draped in the usual sterile fashion. A weighted speculum was placed into the vagina to visualize the cervix. The cervix was grasped with a single-tooth tenaculum. Pap smear was then collected.   The cervix was noted to be dilated, and there was a small cervical polyp at 1 o'clock.   Aveta hysteroscope was inserted into the

## 2025-05-09 NOTE — ANESTHESIA POSTPROCEDURE EVALUATION
Department of Anesthesiology  Postprocedure Note    Patient: Niurka Hsieh  MRN: [id number]  YOB: 1980  Date of evaluation: 5/9/2025    Procedure Summary       Date: 05/09/25 Room / Location: [address]    Anesthesia Start: 0711 Anesthesia Stop: 0807    Procedure: DILATATION AND CURETTAGE HYSTEROSCOPY, POLYPECTOMY, AVETA, PAP SMEAR Diagnosis:       Abnormal uterine bleeding (AUB)      (Abnormal uterine bleeding (AUB) [N93.9])    Surgeons: Martina Bueno DO Responsible Provider: Cj Warner MD    Anesthesia Type: general ASA Status: 2            Anesthesia Type: No value filed.    Stuart Phase I: Stuart Score: 10    Stuart Phase II: Stuart Score: 10    Anesthesia Post Evaluation    Patient location during evaluation: PACU  Patient participation: complete - patient participated  Level of consciousness: awake  Airway patency: patent  Nausea & Vomiting: no nausea and no vomiting  Cardiovascular status: blood pressure returned to baseline  Respiratory status: acceptable  Hydration status: euvolemic  Comments: /77   Pulse 70   Temp 96.8 °F (36 °C) (Temporal)   Resp 19   SpO2 (!) 85%   Pain Assessment: 0-10 Pain Level: 0    No notable events documented.

## 2025-05-09 NOTE — ANESTHESIA PRE PROCEDURE
Department of Anesthesiology  Preprocedure Note       Name:  Niurka Hsieh   Age:  44 y.o.  :  1980                                          MRN:  [id number]         Date:  2025      Surgeon: Surgeon(s):  Martina Bueno DO    Procedure: Procedure(s):  DILATATION AND CURETTAGE HYSTEROSCOPY, POLYPECTOMY, AVETA, PAP SMEAR    Medications prior to admission:   Prior to Admission medications    Medication Sig Start Date End Date Taking? Authorizing Provider   amitriptyline (ELAVIL) 10 MG tablet Take 2 tablets by mouth nightly 25  Yes Luciana Woo DO   rizatriptan (MAXALT-MLT) 10 MG disintegrating tablet Take 1 tablet by mouth once as needed for Migraine May repeat in 2 hours if needed 25  Yes Luciana Woo DO   propranolol (INDERAL LA) 60 MG extended release capsule Take 1 capsule by mouth daily 25  Yes Luciana Woo DO   rimegepant sulfate (NURTEC) 75 MG TBDP Take 75 mg by mouth every other day 25  Yes Luciana Woo DO   norethindrone-ethinyl estradiol-iron (LOESTRIN FE 1.530) 1.5-30 MG-MCG tablet Take 1 tablet by mouth daily 25  Yes Martina Bueno DO   MAGNESIUM GLYCINATE PO Take 240 mg by mouth daily   Yes Maximo Soriano MD   Riboflavin 400 MG CAPS Take 400 mg by mouth daily   Yes Maximo Soriano MD   Multiple Vitamin (MULTIVITAMIN) TABS tablet Take 1 tablet by mouth daily   Yes Maximo Soriano MD   vitamin B-2 (RIBOFLAVIN) 100 MG TABS tablet Take 4 tablets by mouth    Maximo Soriano MD   Lifitegrast (XIIDRA) 5 % SOLN Apply 5 % to eye in the morning and at bedtime    Maximo Soriano MD   Perfluorohexyloctane (MIEBO) 1.338 GM/ML SOLN Apply to eye in the morning and at bedtime    Maximo Soriano MD       Current medications:    No current facility-administered medications for this encounter.       Allergies:  No Known Allergies    Problem List:    Patient Active Problem List   Diagnosis Code   • Cervicalgia M54.2   • 
CREATININE 0.8 10/17/2024 10:56 AM    LABGLOM >90 10/17/2024 10:56 AM    GLUCOSE 103 10/17/2024 10:56 AM    CALCIUM 9.7 10/17/2024 10:56 AM    BILITOT 1.0 10/17/2024 10:56 AM    ALKPHOS 53 10/17/2024 10:56 AM    AST 21 10/17/2024 10:56 AM    ALT 10 10/17/2024 10:56 AM       POC Tests: No results for input(s): \"POCGLU\", \"POCNA\", \"POCK\", \"POCCL\", \"POCBUN\", \"POCHEMO\", \"POCHCT\" in the last 72 hours.    Coags:   Lab Results   Component Value Date/Time    PROTIME 13.0 10/17/2024 10:56 AM    INR 1.0 10/17/2024 10:56 AM    APTT 31.9 10/17/2024 10:56 AM       HCG (If Applicable): No results found for: \"PREGTESTUR\", \"PREGSERUM\", \"HCG\", \"HCGQUANT\"     ABGs: No results found for: \"PHART\", \"PO2ART\", \"EVQ1OZJ\", \"UNK4LJL\", \"BEART\", \"W7YQNCPJ\"     Type & Screen (If Applicable):  No results found for: \"ABORH\", \"LABANTI\"    Drug/Infectious Status (If Applicable):  No results found for: \"HIV\", \"HEPCAB\"    COVID-19 Screening (If Applicable): No results found for: \"COVID19\"        Anesthesia Evaluation  Patient summary reviewed and Nursing notes reviewed   no history of anesthetic complications:   Airway: Mallampati: II  TM distance: >3 FB   Neck ROM: full  Mouth opening: > = 3 FB   Dental: normal exam         Pulmonary:Negative Pulmonary ROS and normal exam  breath sounds clear to auscultation                             Cardiovascular:Negative CV ROS            Rhythm: regular  Rate: normal                    Neuro/Psych:   (+) headaches:            GI/Hepatic/Renal: Neg GI/Hepatic/Renal ROS           ROS comment: Abnormal uterine bleeding.   Endo/Other: Negative Endo/Other ROS                    Abdominal:             Vascular: negative vascular ROS.         Other Findings:             Anesthesia Plan      general     ASA 2       Induction: intravenous.    MIPS: Postoperative opioids intended and Prophylactic antiemetics administered.  Anesthetic plan and risks discussed with patient.    Use of blood products discussed with patient

## 2025-05-12 LAB
HCG, PREGNANCY URINE (POC): NEGATIVE
HPV I/H RISK 4 DNA CVX QL NAA+PROBE: NOT DETECTED
HPV SAMPLE: NORMAL
HPV, INTERPRETATION: NORMAL
HPV16 DNA CVX QL NAA+PROBE: NOT DETECTED
HPV18 DNA CVX QL NAA+PROBE: NOT DETECTED
SPECIMEN DESCRIPTION: NORMAL
SURGICAL PATHOLOGY REPORT: NORMAL

## 2025-05-13 ENCOUNTER — RESULTS FOLLOW-UP (OUTPATIENT)
Dept: OBGYN | Age: 45
End: 2025-05-13

## 2025-05-25 LAB — CYTOLOGY REPORT: NORMAL

## 2025-05-30 ENCOUNTER — OFFICE VISIT (OUTPATIENT)
Dept: OBGYN CLINIC | Age: 45
End: 2025-05-30

## 2025-05-30 VITALS
BODY MASS INDEX: 22.05 KG/M2 | DIASTOLIC BLOOD PRESSURE: 74 MMHG | HEART RATE: 74 BPM | WEIGHT: 140.8 LBS | SYSTOLIC BLOOD PRESSURE: 115 MMHG

## 2025-05-30 DIAGNOSIS — M54.2 CERVICALGIA: ICD-10-CM

## 2025-05-30 DIAGNOSIS — Z98.890 HISTORY OF HYSTEROSCOPY: ICD-10-CM

## 2025-05-30 DIAGNOSIS — R51.9 CHRONIC DAILY HEADACHE: Primary | ICD-10-CM

## 2025-05-30 PROCEDURE — 99024 POSTOP FOLLOW-UP VISIT: CPT | Performed by: STUDENT IN AN ORGANIZED HEALTH CARE EDUCATION/TRAINING PROGRAM

## 2025-05-30 NOTE — PROGRESS NOTES
05/09/2025    Fibroids 05/09/2025    Abnormal uterine bleeding (AUB) 02/17/2025    Cervicalgia 12/16/2024    Cervicogenic headache 12/16/2024    Chronic daily headache 12/16/2024    Migraine without aura and without status migrainosus, not intractable 12/16/2024    Tension type headache 12/16/2024     Return for 1 to 3 months to discuss continuous cycle use.    Counseling Completed:  Strict return precautions with fever, chills, nausea, vomiting, diarrhea, worsening abdominal pain, worsening vaginal bleeding, foul smelling vaginal discharge    DO Indra Sequeira OB/GYN  6/3/2025, 9:39 AM

## 2025-08-28 ENCOUNTER — OFFICE VISIT (OUTPATIENT)
Age: 45
End: 2025-08-28
Payer: COMMERCIAL

## 2025-08-28 VITALS
OXYGEN SATURATION: 95 % | WEIGHT: 145.6 LBS | BODY MASS INDEX: 22.85 KG/M2 | SYSTOLIC BLOOD PRESSURE: 130 MMHG | DIASTOLIC BLOOD PRESSURE: 88 MMHG | HEART RATE: 88 BPM | HEIGHT: 67 IN | TEMPERATURE: 98.1 F

## 2025-08-28 DIAGNOSIS — G43.809 OTHER MIGRAINE WITHOUT STATUS MIGRAINOSUS, NOT INTRACTABLE: Primary | ICD-10-CM

## 2025-08-28 DIAGNOSIS — R51.9 CHRONIC DAILY HEADACHE: ICD-10-CM

## 2025-08-28 PROCEDURE — 99214 OFFICE O/P EST MOD 30 MIN: CPT | Performed by: FAMILY MEDICINE

## 2025-08-28 RX ORDER — NARATRIPTAN 2.5 MG/1
2.5 TABLET ORAL
Qty: 9 TABLET | Refills: 1 | Status: SHIPPED | OUTPATIENT
Start: 2025-08-28

## 2025-08-28 RX ORDER — NORETHINDRONE ACETATE AND ETHINYL ESTRADIOL 1.5-30(21)
1 KIT ORAL DAILY
Qty: 90 TABLET | Refills: 1 | Status: SHIPPED | OUTPATIENT
Start: 2025-08-28

## 2025-08-28 SDOH — ECONOMIC STABILITY: FOOD INSECURITY: WITHIN THE PAST 12 MONTHS, THE FOOD YOU BOUGHT JUST DIDN'T LAST AND YOU DIDN'T HAVE MONEY TO GET MORE.: NEVER TRUE

## 2025-08-28 SDOH — ECONOMIC STABILITY: FOOD INSECURITY: WITHIN THE PAST 12 MONTHS, YOU WORRIED THAT YOUR FOOD WOULD RUN OUT BEFORE YOU GOT MONEY TO BUY MORE.: NEVER TRUE

## 2025-08-28 ASSESSMENT — PATIENT HEALTH QUESTIONNAIRE - PHQ9
SUM OF ALL RESPONSES TO PHQ QUESTIONS 1-9: 0
2. FEELING DOWN, DEPRESSED OR HOPELESS: NOT AT ALL
SUM OF ALL RESPONSES TO PHQ QUESTIONS 1-9: 0
1. LITTLE INTEREST OR PLEASURE IN DOING THINGS: NOT AT ALL

## 2025-08-29 DIAGNOSIS — R51.9 SEVERE HEADACHE: ICD-10-CM

## 2025-08-29 RX ORDER — AMITRIPTYLINE HYDROCHLORIDE 10 MG/1
TABLET ORAL
Qty: 60 TABLET | Refills: 0 | Status: SHIPPED | OUTPATIENT
Start: 2025-08-29

## (undated) DEVICE — GLOVE SURG SZ 6 THK91MIL LTX FREE SYN POLYISOPRENE ANTI

## (undated) DEVICE — 1016 S-DRAPE IRRIG POUCH 10/BOX: Brand: STERI-DRAPE™

## (undated) DEVICE — SYSTEM WST MGMT AVETA

## (undated) DEVICE — COVER OR TBL W40XL90IN ABSRB STD AND GRIPPY BK SAHARA

## (undated) DEVICE — HYSTEROSCOPE RIGID CORAL AVETA DISP

## (undated) DEVICE — Device

## (undated) DEVICE — SOLIDIFIER FLD 3.2OZ 3000CC TRAD IN BTL LIQUI-LOC

## (undated) DEVICE — TUBING, SUCTION, 9/32" X 20', STRAIGHT: Brand: MEDLINE INDUSTRIES, INC.

## (undated) DEVICE — STRAP,POSITIONING,KNEE/BODY,FOAM,4X60": Brand: MEDLINE

## (undated) DEVICE — STRAP ARMBRD W1.5XL32IN FOAM STR YET SFT W/ HK AND LOOP

## (undated) DEVICE — SYSTEM ENDOSCP FLUID MGMT CAP AVETA

## (undated) DEVICE — SOLUTION SCRB 4OZ 2% CHG FOR SURG SCRBBING HND WSH